# Patient Record
Sex: FEMALE | Race: WHITE | URBAN - METROPOLITAN AREA
[De-identification: names, ages, dates, MRNs, and addresses within clinical notes are randomized per-mention and may not be internally consistent; named-entity substitution may affect disease eponyms.]

---

## 2018-01-30 ENCOUNTER — OFFICE (OUTPATIENT)
Dept: URBAN - METROPOLITAN AREA CLINIC 36 | Facility: CLINIC | Age: 63
End: 2018-01-30

## 2018-01-30 VITALS
TEMPERATURE: 97.1 F | WEIGHT: 238 LBS | HEIGHT: 63 IN | DIASTOLIC BLOOD PRESSURE: 90 MMHG | SYSTOLIC BLOOD PRESSURE: 130 MMHG

## 2018-01-30 DIAGNOSIS — K63.5 EXCISION OF COLONIC POLYPS: ICD-10-CM

## 2018-01-30 DIAGNOSIS — R19.7 DIARRHEA (UNSPECIFIED): ICD-10-CM

## 2018-01-30 PROCEDURE — 99244 OFF/OP CNSLTJ NEW/EST MOD 40: CPT | Performed by: INTERNAL MEDICINE

## 2018-01-30 NOTE — SERVICEHPINOTES
This is a   62   year old  female   seen   in consultation at the request of Dr. JAVIER WHITE  . The patient complains of diarrhea.    Diarrheal symptoms started   months   ago.   Bowel movements have been occurring   5 - 7   times per day.    At the onset, diarrhea started   suddenly  .   Stools have been occurring   after meals and at random times of the day  .   The stool is described as   loose and watery   in consistency.   The patient   has   seen blood in the stool.   The patient has also noted   fecal urgency  .   Possible exposures/etiologies include   .    Suspected exacerbating factors include   .   Diarrhea is alleviated so far by   .   The patient has already tried taking   Pepto Bismol  .   Has noted    relief.   Has had    performed thus far for evaluation.   A prior colonoscopy was performed   9   years ago.   pt was found to havecolonic polyps in 2010

## 2018-05-03 ENCOUNTER — OFFICE (OUTPATIENT)
Dept: URBAN - METROPOLITAN AREA CLINIC 36 | Facility: CLINIC | Age: 63
End: 2018-05-03

## 2018-05-03 VITALS
DIASTOLIC BLOOD PRESSURE: 96 MMHG | WEIGHT: 234 LBS | SYSTOLIC BLOOD PRESSURE: 149 MMHG | HEIGHT: 63 IN | TEMPERATURE: 95.9 F

## 2018-05-03 DIAGNOSIS — K52.832: ICD-10-CM

## 2018-05-03 DIAGNOSIS — Z86.010 PERSONAL HISTORY COLON POLYPS: ICD-10-CM

## 2018-05-03 PROCEDURE — 99214 OFFICE O/P EST MOD 30 MIN: CPT | Performed by: INTERNAL MEDICINE

## 2018-05-03 NOTE — SERVICEHPINOTES
CHETNA BROWNBECCA   returns today for follow-up from last visit on   1/30/2018  .    dx w lymphocytic colitis on uceris   doing well  BM nl   no diarrhea/bleeding    no anorexia

## 2018-09-27 ENCOUNTER — OFFICE (OUTPATIENT)
Dept: URBAN - METROPOLITAN AREA CLINIC 36 | Facility: CLINIC | Age: 63
End: 2018-09-27

## 2018-09-27 VITALS
DIASTOLIC BLOOD PRESSURE: 80 MMHG | SYSTOLIC BLOOD PRESSURE: 149 MMHG | WEIGHT: 242 LBS | HEIGHT: 63 IN | TEMPERATURE: 97.3 F

## 2018-09-27 DIAGNOSIS — K52.832: ICD-10-CM

## 2018-09-27 DIAGNOSIS — Z86.010 PERSONAL HISTORY COLON POLYPS: ICD-10-CM

## 2018-09-27 PROCEDURE — 99214 OFFICE O/P EST MOD 30 MIN: CPT | Performed by: INTERNAL MEDICINE

## 2018-09-27 NOTE — SERVICEHPINOTES
CHETNA HUNTER   returns today for follow-up from last visit on   5/3/2018  .    w hx of lymphocytic colitis was doing well on Entocort.  Pt run out of meds and started having 3 to 5 bm/bay   no bleeding    no abx on board   no travelling

## 2019-09-18 ENCOUNTER — HOSPITAL ENCOUNTER (EMERGENCY)
Dept: HOSPITAL 54 - ER | Age: 64
Discharge: ANOTHER HOSPITAL | End: 2019-09-18
Payer: COMMERCIAL

## 2019-09-18 VITALS — HEIGHT: 63 IN | BODY MASS INDEX: 35.26 KG/M2 | WEIGHT: 199 LBS

## 2019-09-18 VITALS — SYSTOLIC BLOOD PRESSURE: 98 MMHG | DIASTOLIC BLOOD PRESSURE: 63 MMHG

## 2019-09-18 DIAGNOSIS — R00.0: ICD-10-CM

## 2019-09-18 DIAGNOSIS — Z79.899: ICD-10-CM

## 2019-09-18 DIAGNOSIS — F31.9: ICD-10-CM

## 2019-09-18 DIAGNOSIS — E87.6: Primary | ICD-10-CM

## 2019-09-18 LAB
BASOPHILS # BLD AUTO: 0.1 /CMM (ref 0–0.2)
BASOPHILS NFR BLD AUTO: 1.1 % (ref 0–2)
BILIRUB DIRECT SERPL-MCNC: 0.1 MG/DL (ref 0–0.2)
BILIRUB SERPL-MCNC: 0.2 MG/DL (ref 0.2–1)
BILIRUB UR QL STRIP: (no result)
BUN SERPL-MCNC: 7 MG/DL (ref 7–18)
CALCIUM SERPL-MCNC: 8 MG/DL (ref 8.5–10.1)
CALCIUM SERPL-MCNC: 8.4 MG/DL (ref 8.5–10.1)
CALCIUM SERPL-MCNC: 9.3 MG/DL (ref 8.5–10.1)
CHLORIDE SERPL-SCNC: 106 MMOL/L (ref 98–107)
CHLORIDE SERPL-SCNC: 109 MMOL/L (ref 98–107)
CHLORIDE SERPL-SCNC: 110 MMOL/L (ref 98–107)
CO2 SERPL-SCNC: 25 MMOL/L (ref 21–32)
CO2 SERPL-SCNC: 27 MMOL/L (ref 21–32)
CO2 SERPL-SCNC: 27 MMOL/L (ref 21–32)
CREAT SERPL-MCNC: 1 MG/DL (ref 0.6–1.3)
CREAT SERPL-MCNC: 1 MG/DL (ref 0.6–1.3)
CREAT SERPL-MCNC: 1.3 MG/DL (ref 0.6–1.3)
EOSINOPHIL NFR BLD AUTO: 1.2 % (ref 0–6)
GLUCOSE SERPL-MCNC: 125 MG/DL (ref 74–106)
GLUCOSE SERPL-MCNC: 231 MG/DL (ref 74–106)
GLUCOSE SERPL-MCNC: 72 MG/DL (ref 74–106)
HCT VFR BLD AUTO: 49 % (ref 33–45)
HGB BLD-MCNC: 16.7 G/DL (ref 11.5–14.8)
LYMPHOCYTES NFR BLD AUTO: 1.1 /CMM (ref 0.8–4.8)
LYMPHOCYTES NFR BLD AUTO: 21 % (ref 20–44)
MCHC RBC AUTO-ENTMCNC: 34 G/DL (ref 31–36)
MCV RBC AUTO: 88 FL (ref 82–100)
MONOCYTES NFR BLD AUTO: 0.6 /CMM (ref 0.1–1.3)
MONOCYTES NFR BLD AUTO: 10.2 % (ref 2–12)
NEUTROPHILS # BLD AUTO: 3.6 /CMM (ref 1.8–8.9)
NEUTROPHILS NFR BLD AUTO: 66.5 % (ref 43–81)
PH UR STRIP: 7 [PH] (ref 5–8)
PLATELET # BLD AUTO: 234 /CMM (ref 150–450)
POTASSIUM SERPL-SCNC: 2.2 MMOL/L (ref 3.5–5.1)
POTASSIUM SERPL-SCNC: 2.6 MMOL/L (ref 3.5–5.1)
POTASSIUM SERPL-SCNC: 2.9 MMOL/L (ref 3.5–5.1)
PROT UR QL STRIP: 30 MG/DL
RBC # BLD AUTO: 5.58 MIL/UL (ref 4–5.2)
RBC #/AREA URNS HPF: (no result) /HPF (ref 0–2)
SODIUM SERPL-SCNC: 143 MMOL/L (ref 136–145)
SODIUM SERPL-SCNC: 145 MMOL/L (ref 136–145)
SODIUM SERPL-SCNC: 145 MMOL/L (ref 136–145)
UROBILINOGEN UR STRIP-MCNC: 0.2 EU/DL
WBC #/AREA URNS HPF: (no result) /HPF (ref 0–3)
WBC NRBC COR # BLD AUTO: 5.4 K/UL (ref 4.3–11)

## 2019-09-18 PROCEDURE — 83735 ASSAY OF MAGNESIUM: CPT

## 2019-09-18 PROCEDURE — 80048 BASIC METABOLIC PNL TOTAL CA: CPT

## 2019-09-18 PROCEDURE — 71045 X-RAY EXAM CHEST 1 VIEW: CPT

## 2019-09-18 PROCEDURE — 96365 THER/PROPH/DIAG IV INF INIT: CPT

## 2019-09-18 PROCEDURE — 81001 URINALYSIS AUTO W/SCOPE: CPT

## 2019-09-18 PROCEDURE — 84484 ASSAY OF TROPONIN QUANT: CPT

## 2019-09-18 PROCEDURE — 96366 THER/PROPH/DIAG IV INF ADDON: CPT

## 2019-09-18 PROCEDURE — 85025 COMPLETE CBC W/AUTO DIFF WBC: CPT

## 2019-09-18 PROCEDURE — 82247 BILIRUBIN TOTAL: CPT

## 2019-09-18 PROCEDURE — 93005 ELECTROCARDIOGRAM TRACING: CPT

## 2019-09-18 PROCEDURE — 36415 COLL VENOUS BLD VENIPUNCTURE: CPT

## 2019-09-18 PROCEDURE — 99285 EMERGENCY DEPT VISIT HI MDM: CPT

## 2019-09-18 PROCEDURE — 83605 ASSAY OF LACTIC ACID: CPT

## 2019-09-18 PROCEDURE — 82248 BILIRUBIN DIRECT: CPT

## 2019-09-18 RX ADMIN — POTASSIUM CHLORIDE SCH MLS/HR: 200 INJECTION, SOLUTION INTRAVENOUS at 15:12

## 2019-09-18 RX ADMIN — POTASSIUM CHLORIDE SCH MLS/HR: 200 INJECTION, SOLUTION INTRAVENOUS at 16:10

## 2019-09-18 NOTE — NUR
Patient picked up by [facility] [address] in stable condition. Written and verbal after 
care instructions given. Patient verbalizes understanding of instruction. 
Patient will be transferred to [facility] Emergency Dept. all belongings 
returned.

## 2019-09-18 NOTE — NUR
[id number], FROM HOME, C/O DIZZINESS, HAD A NEAR SYNCOPAL EPISODE, , 
BILATERAL KNEE BRUISE, NEW ONSET AFIB. TO ER BED 10, HOOKED TO MONITOR, CHANGED 
TO HOSPITAL [facility], PROVIDED W WARM BLANKET, AWAITING MD ESTRELLA.

## 2019-09-18 NOTE — NUR
PER [place] [facility] PT WILL GO TO [facility] DIRECT TO ER, ACCEPTING MD GUERRA, 
[phone] [facility] ETA 18:45